# Patient Record
Sex: MALE | Race: WHITE | NOT HISPANIC OR LATINO | Employment: OTHER | URBAN - METROPOLITAN AREA
[De-identification: names, ages, dates, MRNs, and addresses within clinical notes are randomized per-mention and may not be internally consistent; named-entity substitution may affect disease eponyms.]

---

## 2017-09-20 NOTE — PATIENT DISCUSSION
Epiretinal Membrane Counseling: The diagnosis and natural history of epiretinal membrane was discussed with the patient. The patient is instructed to call back immediately if any vision changes, and keep follow up as scheduled.

## 2017-09-20 NOTE — PATIENT DISCUSSION
Continue: timolol maleate (timolol maleate): drops: 0.5% 1 drop twice a day as directed into both eyes

## 2017-09-20 NOTE — PATIENT DISCUSSION
POAG, OU:  INTRAOCULAR PRESSURE IS WITHIN ACCEPTABLE LIMITS. PATIENT INSTRUCTED TO CONTINUE _timolol sol bid ou_______ AND RETURN FOR FOLLOW-UP AS SCHEDULED.

## 2017-09-20 NOTE — PATIENT DISCUSSION
New Prescription: Betimol (timolol): drops: 0.5% 1 as directed twice a day as directed into both eyes 09-

## 2018-05-30 NOTE — PATIENT DISCUSSION
HYDROXYCHLOROQUINE (PLAQUENIL) USE:  NO OCULAR TOXICITY OU. CONTINUE PLAQUENIL AS DIRECTED. SCHEDULE OCT MACULA, AND DILATED FUNDUS EXAM.  KEEP FOLLOW UP AS SCHEDULED.

## 2018-05-30 NOTE — PATIENT DISCUSSION
Hydroxychloroquine (Plaquenil) Counseling: The patient is encouraged to call back with any visual disturbance, and to keep follow-up appointments as scheduled.

## 2018-05-30 NOTE — PATIENT DISCUSSION
POAG, OU:  INTRAOCULAR PRESSURE IS WITHIN ACCEPTABLE LIMITS. PATIENT INSTRUCTED TO CONTINUE __timolol bid ou______ AND RETURN FOR FOLLOW-UP AS SCHEDULED.

## 2019-01-15 NOTE — PATIENT DISCUSSION
POAG, OU:  INTRAOCULAR PRESSURE IS WITHIN ACCEPTABLE LIMITS. PATIENT INSTRUCTED TO CONTINUE _timolol bid ou_______ AND RETURN FOR FOLLOW-UP AS SCHEDULED.

## 2022-03-01 ENCOUNTER — NEW PATIENT (OUTPATIENT)
Dept: URBAN - METROPOLITAN AREA CLINIC 32 | Facility: CLINIC | Age: 62
End: 2022-03-01

## 2022-03-01 DIAGNOSIS — H25.13: ICD-10-CM

## 2022-03-01 PROCEDURE — 99203 OFFICE O/P NEW LOW 30 MIN: CPT

## 2022-03-01 PROCEDURE — 92015 DETERMINE REFRACTIVE STATE: CPT

## 2022-03-01 ASSESSMENT — TONOMETRY
OD_IOP_MMHG: 12
OS_IOP_MMHG: 11

## 2022-03-01 ASSESSMENT — KERATOMETRY
OS_K1POWER_DIOPTERS: 42.25
OD_K2POWER_DIOPTERS: 41.75
OD_AXISANGLE2_DEGREES: 125
OS_AXISANGLE2_DEGREES: 25
OS_K2POWER_DIOPTERS: 42.00
OD_K1POWER_DIOPTERS: 42.25
OD_AXISANGLE_DEGREES: 35
OS_AXISANGLE_DEGREES: 115

## 2022-03-01 ASSESSMENT — VISUAL ACUITY
OS_SC: 20/30-1
OD_SC: 20/40+2
OS_CC: 20/30
OS_CC: J2
OD_CC: J1+
OS_SC: J2
OD_CC: 20/40+2
OD_SC: J1+

## 2022-03-14 ASSESSMENT — KERATOMETRY
OS_AXISANGLE2_DEGREES: 25
OS_K2POWER_DIOPTERS: 42.00
OD_AXISANGLE2_DEGREES: 125
OD_K2POWER_DIOPTERS: 41.75
OD_AXISANGLE_DEGREES: 35
OS_AXISANGLE_DEGREES: 115
OS_K1POWER_DIOPTERS: 42.25
OD_K1POWER_DIOPTERS: 42.25

## 2022-03-17 ENCOUNTER — DIAGNOSTICS ONLY (OUTPATIENT)
Dept: URBAN - METROPOLITAN AREA CLINIC 32 | Facility: CLINIC | Age: 62
End: 2022-03-17

## 2022-03-17 DIAGNOSIS — H53.8: ICD-10-CM

## 2022-03-17 DIAGNOSIS — H43.813: ICD-10-CM

## 2022-03-17 DIAGNOSIS — H25.13: ICD-10-CM

## 2022-03-17 DIAGNOSIS — H18.593: ICD-10-CM

## 2022-03-17 DIAGNOSIS — H31.093: ICD-10-CM

## 2022-03-17 DIAGNOSIS — H25.042: ICD-10-CM

## 2022-03-17 PROCEDURE — 99214 OFFICE O/P EST MOD 30 MIN: CPT

## 2022-03-17 PROCEDURE — 92136TC INTERFEROMETRY - TECHNICAL COMPONENT

## 2022-03-17 PROCEDURE — 92134 CPTRZ OPH DX IMG PST SGM RTA: CPT

## 2022-03-17 PROCEDURE — 92025 CPTRIZED CORNEAL TOPOGRAPHY: CPT

## 2022-03-17 PROCEDURE — 92286 ANT SGM IMG I&R SPECLR MIC: CPT

## 2022-03-17 ASSESSMENT — VISUAL ACUITY
OS_CC: 20/30
OS_SC: 20/30-1
OD_GLARE: 20/100
OS_CC: J4
OD_SC: J1+
OD_SC: 20/40+2
OS_SC: J2
OD_CC: J1+
OS_GLARE: 20/100
OD_CC: 20/50

## 2022-03-17 NOTE — PATIENT DISCUSSION
Hx RD treatments several years ago, possible cryo or laser.  No new RT/RD/MH seen today OU  .  Recommended observation.

## 2022-03-17 NOTE — PATIENT DISCUSSION
Explained pt's current focus is mono VA contributing to blurriness . Recommend obtaining VF to see if other issues are affecting VA beyond the level of cataract present and retinal evaluation before finalizing cataract plan .

## 2022-03-17 NOTE — PATIENT DISCUSSION
Pt reports difficulty focusing and blurriness at distance , worsens Mid day , has noticed in the past 5 months . Pt stated OD has been weaker than OS for a long time .

## 2022-03-17 NOTE — PATIENT DISCUSSION
Discussed potential need for Yag capsulotomy secondary to [facility]. Pt understands [facility] develops at a faster phase .

## 2022-04-04 ENCOUNTER — NEW PATIENT (OUTPATIENT)
Dept: URBAN - METROPOLITAN AREA CLINIC 33 | Facility: CLINIC | Age: 62
End: 2022-04-04

## 2022-04-04 VITALS
SYSTOLIC BLOOD PRESSURE: 162 MMHG | HEIGHT: 72 IN | WEIGHT: 178 LBS | HEART RATE: 71 BPM | DIASTOLIC BLOOD PRESSURE: 94 MMHG | BODY MASS INDEX: 24.11 KG/M2

## 2022-04-04 DIAGNOSIS — H43.393: ICD-10-CM

## 2022-04-04 DIAGNOSIS — H35.413: ICD-10-CM

## 2022-04-04 PROCEDURE — 92004 COMPRE OPH EXAM NEW PT 1/>: CPT

## 2022-04-04 PROCEDURE — 92201 OPSCPY EXTND RTA DRAW UNI/BI: CPT

## 2022-04-04 ASSESSMENT — VISUAL ACUITY
OD_SC: 20/30+2
OD_CC: 20/20-2
OS_SC: 20/25+2
OS_CC: 20/20-1

## 2022-04-04 ASSESSMENT — TONOMETRY
OD_IOP_MMHG: 11
OS_IOP_MMHG: 14

## 2022-04-14 ENCOUNTER — FOLLOW UP (OUTPATIENT)
Dept: URBAN - METROPOLITAN AREA CLINIC 32 | Facility: CLINIC | Age: 62
End: 2022-04-14

## 2022-04-14 DIAGNOSIS — H25.042: ICD-10-CM

## 2022-04-14 DIAGNOSIS — H40.013: ICD-10-CM

## 2022-04-14 DIAGNOSIS — H25.13: ICD-10-CM

## 2022-04-14 PROCEDURE — 99213 OFFICE O/P EST LOW 20 MIN: CPT

## 2022-04-14 PROCEDURE — 92083 EXTENDED VISUAL FIELD XM: CPT

## 2022-04-14 ASSESSMENT — KERATOMETRY
OS_K1POWER_DIOPTERS: 42.25
OD_AXISANGLE_DEGREES: 35
OS_AXISANGLE_DEGREES: 115
OD_K2POWER_DIOPTERS: 41.75
OD_K1POWER_DIOPTERS: 42.25
OD_AXISANGLE2_DEGREES: 125
OS_AXISANGLE2_DEGREES: 25
OS_K2POWER_DIOPTERS: 42.00

## 2022-04-14 ASSESSMENT — VISUAL ACUITY
OD_CC: 20/25
OS_CC: 20/25

## 2022-04-14 ASSESSMENT — TONOMETRY
OD_IOP_MMHG: 15
OS_IOP_MMHG: 18

## 2022-04-14 NOTE — PATIENT DISCUSSION
Reiterated pt's current focus is mono VA and is contributing to blurriness . VF performed today , no specific POAG findins , WNL OU .

## 2022-04-14 NOTE — PATIENT DISCUSSION
Discussed potential need for Yag capsulotomy secondary to [facility]. Pt understands [facility] develops at a faster phase .

## 2022-04-14 NOTE — PATIENT DISCUSSION
Baseline VF performed today to evaluate prior to Cat sx . Advised no other etiologies detected on VF today , stable OU.  Okay to proceed with CE .

## 2022-04-14 NOTE — PATIENT DISCUSSION
Discussed pt' s lifestyle and VA expectations . Pt defers ADVANCED packages . Wishes to keep monoVA .

## 2022-04-14 NOTE — PATIENT DISCUSSION
ADDENDUM 4/18/22 , Pt phoned and spoke to Sx , wishes to stay with BASIC plus package. Pt understands he will likely need glasses at all distances for best vision correction .

## 2022-04-20 ENCOUNTER — SURGERY/PROCEDURE (OUTPATIENT)
Dept: URBAN - METROPOLITAN AREA CLINIC 32 | Facility: CLINIC | Age: 62
End: 2022-04-20

## 2022-04-20 DIAGNOSIS — H25.12: ICD-10-CM

## 2022-04-20 PROCEDURE — 66984 XCAPSL CTRC RMVL W/O ECP: CPT

## 2022-04-20 ASSESSMENT — KERATOMETRY
OD_AXISANGLE_DEGREES: 35
OS_K2POWER_DIOPTERS: 42.00
OD_AXISANGLE2_DEGREES: 125
OS_K1POWER_DIOPTERS: 42.25
OD_K1POWER_DIOPTERS: 42.25
OD_K2POWER_DIOPTERS: 41.75
OS_AXISANGLE2_DEGREES: 25
OS_AXISANGLE_DEGREES: 115

## 2022-04-20 NOTE — PATIENT DISCUSSION
Discussed potential need for Yag capsulotomy secondary to [facility]. Pt understands [facility] develops at a faster phase .

## 2022-04-21 ENCOUNTER — POST-OP (OUTPATIENT)
Dept: URBAN - METROPOLITAN AREA CLINIC 32 | Facility: CLINIC | Age: 62
End: 2022-04-21

## 2022-04-21 DIAGNOSIS — Z96.1: ICD-10-CM

## 2022-04-21 PROCEDURE — 99024 POSTOP FOLLOW-UP VISIT: CPT

## 2022-04-21 ASSESSMENT — TONOMETRY: OS_IOP_MMHG: 14

## 2022-04-21 ASSESSMENT — VISUAL ACUITY: OS_SC: 20/25-2

## 2022-04-21 NOTE — PATIENT DISCUSSION
Discussed potential need for Yag capsulotomy secondary to [facility]. Pt understands [facility] develops at a faster phase .

## 2022-04-27 ASSESSMENT — KERATOMETRY
OD_AXISANGLE2_DEGREES: 125
OS_AXISANGLE2_DEGREES: 25
OS_K1POWER_DIOPTERS: 42.25
OD_AXISANGLE_DEGREES: 35
OD_K2POWER_DIOPTERS: 41.75
OS_K2POWER_DIOPTERS: 42.00
OD_K1POWER_DIOPTERS: 42.25
OS_AXISANGLE_DEGREES: 115

## 2022-04-28 ENCOUNTER — POST OP/EVAL OF SECOND EYE (OUTPATIENT)
Dept: URBAN - METROPOLITAN AREA CLINIC 32 | Facility: CLINIC | Age: 62
End: 2022-04-28

## 2022-04-28 DIAGNOSIS — H25.11: ICD-10-CM

## 2022-04-28 DIAGNOSIS — Z96.1: ICD-10-CM

## 2022-04-28 PROCEDURE — 99212 OFFICE O/P EST SF 10 MIN: CPT

## 2022-04-28 PROCEDURE — V2799PMN IMPRIMIS PRED-MOXI-NEPAF 5ML

## 2022-04-28 ASSESSMENT — KERATOMETRY
OS_K2POWER_DIOPTERS: 42.00
OS_AXISANGLE2_DEGREES: 25
OS_K1POWER_DIOPTERS: 42.25
OD_AXISANGLE_DEGREES: 35
OS_AXISANGLE_DEGREES: 115
OD_K2POWER_DIOPTERS: 41.75
OD_AXISANGLE2_DEGREES: 125
OD_K1POWER_DIOPTERS: 42.25

## 2022-04-28 ASSESSMENT — VISUAL ACUITY: OS_SC: 20/20

## 2022-04-28 ASSESSMENT — TONOMETRY: OS_IOP_MMHG: 12

## 2022-04-28 NOTE — PATIENT DISCUSSION
The floaters secondary to the PVD have become increasingly symptomatic to the patient such that they are affecting the daily activities and the patient’s quality of life.

## 2022-04-28 NOTE — PATIENT DISCUSSION
EYE OD, IOL TYPE BASIC, POST OPERATIVE TARGET PL/-0.50, PACKAGE ; Pt wishes to stay with BASIC plus package. Pt understands he will likely need glasses at all distances for best vision correction .

## 2022-04-28 NOTE — PATIENT DISCUSSION
Discussed potential need for Yag capsulotomy secondary to [facility]. Pt understands [facility] develops at a faster phase .

## 2022-05-03 ENCOUNTER — SURGERY/PROCEDURE (OUTPATIENT)
Dept: URBAN - METROPOLITAN AREA CLINIC 32 | Facility: CLINIC | Age: 62
End: 2022-05-03

## 2022-05-03 DIAGNOSIS — H25.11: ICD-10-CM

## 2022-05-03 PROCEDURE — 66984 XCAPSL CTRC RMVL W/O ECP: CPT

## 2022-05-03 ASSESSMENT — KERATOMETRY
OD_AXISANGLE_DEGREES: 35
OS_AXISANGLE_DEGREES: 115
OS_AXISANGLE2_DEGREES: 25
OD_AXISANGLE2_DEGREES: 125
OD_K2POWER_DIOPTERS: 41.75
OD_K1POWER_DIOPTERS: 42.25
OS_K2POWER_DIOPTERS: 42.00
OS_K1POWER_DIOPTERS: 42.25

## 2022-05-03 NOTE — PATIENT DISCUSSION
Discussed potential need for Yag capsulotomy secondary to [facility] [place]. Pt understands [facility] [place] develops at a faster phase .

## 2022-05-04 ENCOUNTER — POST-OP (OUTPATIENT)
Dept: URBAN - METROPOLITAN AREA CLINIC 32 | Facility: CLINIC | Age: 62
End: 2022-05-04

## 2022-05-04 DIAGNOSIS — Z96.1: ICD-10-CM

## 2022-05-04 PROCEDURE — 99024 POSTOP FOLLOW-UP VISIT: CPT

## 2022-05-04 ASSESSMENT — TONOMETRY: OD_IOP_MMHG: 18

## 2022-05-04 ASSESSMENT — VISUAL ACUITY: OD_SC: 20/25+2

## 2022-05-04 NOTE — PATIENT DISCUSSION
Discussed potential need for Yag capsulotomy secondary to [facility]. Pt understands [facility] develops at a faster phase .

## 2022-05-06 ASSESSMENT — KERATOMETRY
OD_K2POWER_DIOPTERS: 41.75
OD_AXISANGLE2_DEGREES: 125
OD_K1POWER_DIOPTERS: 42.25
OS_AXISANGLE2_DEGREES: 25
OD_AXISANGLE_DEGREES: 35
OS_K2POWER_DIOPTERS: 42.00
OS_AXISANGLE_DEGREES: 115
OS_K1POWER_DIOPTERS: 42.25

## 2022-05-10 ENCOUNTER — POST-OP (OUTPATIENT)
Dept: URBAN - METROPOLITAN AREA CLINIC 32 | Facility: CLINIC | Age: 62
End: 2022-05-10

## 2022-05-10 DIAGNOSIS — Z96.1: ICD-10-CM

## 2022-05-10 PROCEDURE — 99024 POSTOP FOLLOW-UP VISIT: CPT

## 2022-05-10 ASSESSMENT — TONOMETRY: OD_IOP_MMHG: 18

## 2022-05-10 ASSESSMENT — KERATOMETRY
OD_AXISANGLE2_DEGREES: 125
OS_AXISANGLE2_DEGREES: 25
OS_K1POWER_DIOPTERS: 42.25
OS_AXISANGLE_DEGREES: 115
OD_AXISANGLE_DEGREES: 35
OD_K1POWER_DIOPTERS: 42.25
OD_K2POWER_DIOPTERS: 41.75
OS_K2POWER_DIOPTERS: 42.00

## 2022-05-10 ASSESSMENT — VISUAL ACUITY: OD_SC: 20/25-2

## 2022-05-10 NOTE — PATIENT DISCUSSION
1wk PO: Patient is doing well post-operatively. The importance of post-op drop compliance was emphasized. Drop schedule reviewed with patient. Patient to call if any visual changes or concerns. Name band;

## 2022-05-10 NOTE — PATIENT DISCUSSION
Discussed potential need for Yag capsulotomy secondary to [facility]. Pt understands [facility] develops at a faster phase .

## 2022-12-28 ENCOUNTER — COMPREHENSIVE EXAM (OUTPATIENT)
Dept: URBAN - METROPOLITAN AREA CLINIC 32 | Facility: CLINIC | Age: 62
End: 2022-12-28

## 2022-12-28 PROCEDURE — 99214 OFFICE O/P EST MOD 30 MIN: CPT

## 2022-12-28 PROCEDURE — 92134 CPTRZ OPH DX IMG PST SGM RTA: CPT

## 2022-12-28 ASSESSMENT — VISUAL ACUITY
OD_CC: 20/20-1
OD_SC: J7
OS_CC: J1+
OD_GLARE: 20/50
OD_SC: 20/25-3
OS_SC: J7
OS_SC: 20/20-2
OD_CC: J1+
OS_CC: 20/20-2

## 2022-12-28 ASSESSMENT — TONOMETRY
OS_IOP_MMHG: 12
OD_IOP_MMHG: 12

## 2022-12-28 NOTE — PATIENT DISCUSSION
The floaters/symptoms are secondary to the PVD , which have become increasingly symptomatic to the patient such that they are affecting the daily activities and the patient’s quality of life.

## 2022-12-28 NOTE — PATIENT DISCUSSION
Discussed potential need for Yag capsulotomy secondary to [facility] [place]. Pt understands [facility] [place] develops at a faster phase .

## 2023-01-23 ENCOUNTER — SURGERY/PROCEDURE (OUTPATIENT)
Dept: URBAN - METROPOLITAN AREA CLINIC 32 | Facility: CLINIC | Age: 63
End: 2023-01-23

## 2023-01-23 DIAGNOSIS — H26.491: ICD-10-CM

## 2023-01-23 PROCEDURE — 66821 AFTER CATARACT LASER SURGERY: CPT

## 2023-01-23 NOTE — PATIENT DISCUSSION
Discussed potential need for Yag capsulotomy secondary to [facility]. Pt understands [facility] develops at a faster phase .